# Patient Record
Sex: FEMALE | Race: WHITE | Employment: FULL TIME
[De-identification: names, ages, dates, MRNs, and addresses within clinical notes are randomized per-mention and may not be internally consistent; named-entity substitution may affect disease eponyms.]

---

## 2017-01-03 ENCOUNTER — OFFICE VISIT (OUTPATIENT)
Dept: GASTROENTEROLOGY | Facility: CLINIC | Age: 67
End: 2017-01-03

## 2017-01-03 VITALS
WEIGHT: 144.8 LBS | SYSTOLIC BLOOD PRESSURE: 128 MMHG | OXYGEN SATURATION: 97 % | BODY MASS INDEX: 25.66 KG/M2 | HEART RATE: 64 BPM | RESPIRATION RATE: 14 BRPM | TEMPERATURE: 97.3 F | HEIGHT: 63 IN | DIASTOLIC BLOOD PRESSURE: 66 MMHG

## 2017-01-03 DIAGNOSIS — K59.00 CONSTIPATION, UNSPECIFIED CONSTIPATION TYPE: ICD-10-CM

## 2017-01-03 DIAGNOSIS — K21.9 GASTROESOPHAGEAL REFLUX DISEASE WITHOUT ESOPHAGITIS: ICD-10-CM

## 2017-01-03 DIAGNOSIS — K58.9 IRRITABLE BOWEL SYNDROME WITHOUT DIARRHEA: Primary | ICD-10-CM

## 2017-01-03 PROCEDURE — 99214 OFFICE O/P EST MOD 30 MIN: CPT | Performed by: INTERNAL MEDICINE

## 2017-01-03 RX ORDER — MELOXICAM 15 MG/1
TABLET ORAL
COMMUNITY
Start: 2016-12-05 | End: 2017-01-03

## 2017-01-03 RX ORDER — AMOXICILLIN 500 MG/1
CAPSULE ORAL
COMMUNITY
Start: 2016-12-29 | End: 2017-02-01 | Stop reason: ALTCHOICE

## 2017-01-03 RX ORDER — MELOXICAM 7.5 MG/1
TABLET ORAL
COMMUNITY
Start: 2016-11-29 | End: 2017-02-08

## 2017-01-03 ASSESSMENT — ENCOUNTER SYMPTOMS
BLOOD IN STOOL: 0
DIARRHEA: 1
CONSTIPATION: 1
ABDOMINAL PAIN: 1
SINUS PRESSURE: 1
ABDOMINAL DISTENTION: 1
COUGH: 1
EYES NEGATIVE: 1
BACK PAIN: 1
RECTAL PAIN: 0
ANAL BLEEDING: 0
VOMITING: 0
NAUSEA: 1

## 2017-01-04 DIAGNOSIS — K21.9 GASTROESOPHAGEAL REFLUX DISEASE WITHOUT ESOPHAGITIS: ICD-10-CM

## 2017-02-01 ENCOUNTER — OFFICE VISIT (OUTPATIENT)
Dept: OBGYN | Facility: CLINIC | Age: 67
End: 2017-02-01

## 2017-02-01 VITALS
BODY MASS INDEX: 23.9 KG/M2 | SYSTOLIC BLOOD PRESSURE: 118 MMHG | WEIGHT: 140 LBS | DIASTOLIC BLOOD PRESSURE: 74 MMHG | HEIGHT: 64 IN | HEART RATE: 78 BPM

## 2017-02-01 DIAGNOSIS — N32.89 BLADDER SPASMS: Primary | ICD-10-CM

## 2017-02-01 PROCEDURE — 1036F TOBACCO NON-USER: CPT | Performed by: OBSTETRICS & GYNECOLOGY

## 2017-02-01 PROCEDURE — 3014F SCREEN MAMMO DOC REV: CPT | Performed by: OBSTETRICS & GYNECOLOGY

## 2017-02-01 PROCEDURE — 3017F COLORECTAL CA SCREEN DOC REV: CPT | Performed by: OBSTETRICS & GYNECOLOGY

## 2017-02-01 PROCEDURE — 1123F ACP DISCUSS/DSCN MKR DOCD: CPT | Performed by: OBSTETRICS & GYNECOLOGY

## 2017-02-01 PROCEDURE — G8420 CALC BMI NORM PARAMETERS: HCPCS | Performed by: OBSTETRICS & GYNECOLOGY

## 2017-02-01 PROCEDURE — G8484 FLU IMMUNIZE NO ADMIN: HCPCS | Performed by: OBSTETRICS & GYNECOLOGY

## 2017-02-01 PROCEDURE — 4040F PNEUMOC VAC/ADMIN/RCVD: CPT | Performed by: OBSTETRICS & GYNECOLOGY

## 2017-02-01 PROCEDURE — 1090F PRES/ABSN URINE INCON ASSESS: CPT | Performed by: OBSTETRICS & GYNECOLOGY

## 2017-02-01 PROCEDURE — G8400 PT W/DXA NO RESULTS DOC: HCPCS | Performed by: OBSTETRICS & GYNECOLOGY

## 2017-02-01 PROCEDURE — G8427 DOCREV CUR MEDS BY ELIG CLIN: HCPCS | Performed by: OBSTETRICS & GYNECOLOGY

## 2017-02-01 PROCEDURE — 99213 OFFICE O/P EST LOW 20 MIN: CPT | Performed by: OBSTETRICS & GYNECOLOGY

## 2017-02-01 RX ORDER — SULFAMETHOXAZOLE AND TRIMETHOPRIM 800; 160 MG/1; MG/1
1 TABLET ORAL 2 TIMES DAILY
Qty: 6 TABLET | Refills: 0 | Status: SHIPPED | OUTPATIENT
Start: 2017-02-01 | End: 2017-02-04

## 2017-02-01 RX ORDER — PHENAZOPYRIDINE HYDROCHLORIDE 200 MG/1
200 TABLET, FILM COATED ORAL 3 TIMES DAILY PRN
Qty: 9 TABLET | Refills: 0 | Status: SHIPPED | OUTPATIENT
Start: 2017-02-01 | End: 2017-02-04

## 2017-02-01 ASSESSMENT — ENCOUNTER SYMPTOMS
ABDOMINAL PAIN: 1
NAUSEA: 0
EYE DISCHARGE: 0
SHORTNESS OF BREATH: 0
COUGH: 0
SINUS PRESSURE: 0
EYE PAIN: 0

## 2017-02-08 ENCOUNTER — OFFICE VISIT (OUTPATIENT)
Dept: GASTROENTEROLOGY | Facility: CLINIC | Age: 67
End: 2017-02-08

## 2017-02-08 VITALS
TEMPERATURE: 97.3 F | OXYGEN SATURATION: 97 % | RESPIRATION RATE: 14 BRPM | BODY MASS INDEX: 25.87 KG/M2 | HEIGHT: 63 IN | WEIGHT: 146 LBS | SYSTOLIC BLOOD PRESSURE: 123 MMHG | DIASTOLIC BLOOD PRESSURE: 69 MMHG | HEART RATE: 69 BPM

## 2017-02-08 DIAGNOSIS — K21.9 GASTROESOPHAGEAL REFLUX DISEASE WITHOUT ESOPHAGITIS: ICD-10-CM

## 2017-02-08 DIAGNOSIS — K58.9 IRRITABLE BOWEL SYNDROME WITHOUT DIARRHEA: Primary | ICD-10-CM

## 2017-02-08 DIAGNOSIS — R10.9 ABDOMINAL PAIN, OTHER SPECIFIED SITE: ICD-10-CM

## 2017-02-08 DIAGNOSIS — K59.00 CONSTIPATION, UNSPECIFIED CONSTIPATION TYPE: ICD-10-CM

## 2017-02-08 PROCEDURE — 3017F COLORECTAL CA SCREEN DOC REV: CPT | Performed by: INTERNAL MEDICINE

## 2017-02-08 PROCEDURE — 99214 OFFICE O/P EST MOD 30 MIN: CPT | Performed by: INTERNAL MEDICINE

## 2017-02-08 PROCEDURE — 1090F PRES/ABSN URINE INCON ASSESS: CPT | Performed by: INTERNAL MEDICINE

## 2017-02-08 PROCEDURE — 4040F PNEUMOC VAC/ADMIN/RCVD: CPT | Performed by: INTERNAL MEDICINE

## 2017-02-08 PROCEDURE — 1036F TOBACCO NON-USER: CPT | Performed by: INTERNAL MEDICINE

## 2017-02-08 PROCEDURE — G8484 FLU IMMUNIZE NO ADMIN: HCPCS | Performed by: INTERNAL MEDICINE

## 2017-02-08 PROCEDURE — 1123F ACP DISCUSS/DSCN MKR DOCD: CPT | Performed by: INTERNAL MEDICINE

## 2017-02-08 PROCEDURE — 3014F SCREEN MAMMO DOC REV: CPT | Performed by: INTERNAL MEDICINE

## 2017-02-08 PROCEDURE — G8427 DOCREV CUR MEDS BY ELIG CLIN: HCPCS | Performed by: INTERNAL MEDICINE

## 2017-02-08 PROCEDURE — G8420 CALC BMI NORM PARAMETERS: HCPCS | Performed by: INTERNAL MEDICINE

## 2017-02-08 PROCEDURE — G8400 PT W/DXA NO RESULTS DOC: HCPCS | Performed by: INTERNAL MEDICINE

## 2017-02-08 RX ORDER — OXYBUTYNIN CHLORIDE 5 MG/1
TABLET ORAL
COMMUNITY
Start: 2017-02-06 | End: 2017-03-09

## 2017-02-08 ASSESSMENT — ENCOUNTER SYMPTOMS
BLOOD IN STOOL: 0
RECTAL PAIN: 0
CONSTIPATION: 0
BACK PAIN: 1
RESPIRATORY NEGATIVE: 1
NAUSEA: 0
ABDOMINAL PAIN: 1
VOMITING: 0
ALLERGIC/IMMUNOLOGIC NEGATIVE: 1
ABDOMINAL DISTENTION: 1
EYES NEGATIVE: 1
ANAL BLEEDING: 0
DIARRHEA: 0

## 2017-02-13 ENCOUNTER — TELEPHONE (OUTPATIENT)
Dept: GASTROENTEROLOGY | Facility: CLINIC | Age: 67
End: 2017-02-13

## 2017-02-13 RX ORDER — DICYCLOMINE HCL 20 MG
20 TABLET ORAL 3 TIMES DAILY PRN
Qty: 90 TABLET | Refills: 1 | OUTPATIENT
Start: 2017-02-13

## 2017-03-01 ENCOUNTER — HOSPITAL ENCOUNTER (OUTPATIENT)
Age: 67
Setting detail: SPECIMEN
Discharge: HOME | End: 2017-03-01
Payer: MEDICARE

## 2017-03-01 ENCOUNTER — OFFICE VISIT (OUTPATIENT)
Dept: OBGYN | Facility: CLINIC | Age: 67
End: 2017-03-01

## 2017-03-01 VITALS
HEIGHT: 64 IN | SYSTOLIC BLOOD PRESSURE: 96 MMHG | WEIGHT: 132.5 LBS | HEART RATE: 58 BPM | BODY MASS INDEX: 22.62 KG/M2 | DIASTOLIC BLOOD PRESSURE: 59 MMHG

## 2017-03-01 DIAGNOSIS — Z13.820 SCREENING FOR OSTEOPOROSIS: ICD-10-CM

## 2017-03-01 DIAGNOSIS — Z01.419 WOMEN'S ANNUAL ROUTINE GYNECOLOGICAL EXAMINATION: Primary | ICD-10-CM

## 2017-03-01 DIAGNOSIS — Z12.31 SCREENING MAMMOGRAM, ENCOUNTER FOR: ICD-10-CM

## 2017-03-01 PROCEDURE — G0101 CA SCREEN;PELVIC/BREAST EXAM: HCPCS | Performed by: OBSTETRICS & GYNECOLOGY

## 2017-03-07 LAB — CYTOLOGY REPORT: NORMAL

## 2017-03-09 ENCOUNTER — OFFICE VISIT (OUTPATIENT)
Dept: GASTROENTEROLOGY | Facility: CLINIC | Age: 67
End: 2017-03-09

## 2017-03-09 VITALS
HEART RATE: 69 BPM | HEIGHT: 63 IN | BODY MASS INDEX: 23.39 KG/M2 | SYSTOLIC BLOOD PRESSURE: 117 MMHG | WEIGHT: 132 LBS | RESPIRATION RATE: 14 BRPM | DIASTOLIC BLOOD PRESSURE: 74 MMHG | OXYGEN SATURATION: 98 % | TEMPERATURE: 97.1 F

## 2017-03-09 DIAGNOSIS — K21.9 GASTROESOPHAGEAL REFLUX DISEASE, ESOPHAGITIS PRESENCE NOT SPECIFIED: ICD-10-CM

## 2017-03-09 DIAGNOSIS — K21.9 GASTROESOPHAGEAL REFLUX DISEASE WITHOUT ESOPHAGITIS: ICD-10-CM

## 2017-03-09 DIAGNOSIS — K58.9 IRRITABLE BOWEL SYNDROME WITHOUT DIARRHEA: Primary | ICD-10-CM

## 2017-03-09 PROCEDURE — 3014F SCREEN MAMMO DOC REV: CPT | Performed by: INTERNAL MEDICINE

## 2017-03-09 PROCEDURE — 4040F PNEUMOC VAC/ADMIN/RCVD: CPT | Performed by: INTERNAL MEDICINE

## 2017-03-09 PROCEDURE — G8420 CALC BMI NORM PARAMETERS: HCPCS | Performed by: INTERNAL MEDICINE

## 2017-03-09 PROCEDURE — 1036F TOBACCO NON-USER: CPT | Performed by: INTERNAL MEDICINE

## 2017-03-09 PROCEDURE — 99214 OFFICE O/P EST MOD 30 MIN: CPT | Performed by: INTERNAL MEDICINE

## 2017-03-09 PROCEDURE — 1090F PRES/ABSN URINE INCON ASSESS: CPT | Performed by: INTERNAL MEDICINE

## 2017-03-09 PROCEDURE — 3017F COLORECTAL CA SCREEN DOC REV: CPT | Performed by: INTERNAL MEDICINE

## 2017-03-09 PROCEDURE — G8400 PT W/DXA NO RESULTS DOC: HCPCS | Performed by: INTERNAL MEDICINE

## 2017-03-09 PROCEDURE — 1123F ACP DISCUSS/DSCN MKR DOCD: CPT | Performed by: INTERNAL MEDICINE

## 2017-03-09 PROCEDURE — G8427 DOCREV CUR MEDS BY ELIG CLIN: HCPCS | Performed by: INTERNAL MEDICINE

## 2017-03-09 PROCEDURE — G8484 FLU IMMUNIZE NO ADMIN: HCPCS | Performed by: INTERNAL MEDICINE

## 2017-03-09 RX ORDER — FLUCONAZOLE 100 MG/1
100 TABLET ORAL DAILY
Qty: 5 TABLET | Refills: 0 | Status: SHIPPED | OUTPATIENT
Start: 2017-03-09 | End: 2017-03-14

## 2017-03-09 RX ORDER — METRONIDAZOLE 500 MG/1
500 TABLET ORAL 3 TIMES DAILY
Qty: 21 TABLET | Refills: 0 | Status: SHIPPED | OUTPATIENT
Start: 2017-03-09 | End: 2017-03-16

## 2017-03-09 RX ORDER — POLYETHYLENE GLYCOL 3350 17 G/17G
17 POWDER, FOR SOLUTION ORAL DAILY
COMMUNITY

## 2017-03-09 RX ORDER — CIPROFLOXACIN 500 MG/1
500 TABLET, FILM COATED ORAL 2 TIMES DAILY
Qty: 12 TABLET | Refills: 0 | Status: SHIPPED | OUTPATIENT
Start: 2017-03-09 | End: 2017-03-16

## 2017-03-09 ASSESSMENT — ENCOUNTER SYMPTOMS
DIARRHEA: 0
BACK PAIN: 1
RESPIRATORY NEGATIVE: 1
CONSTIPATION: 0
NAUSEA: 0
EYES NEGATIVE: 1
ABDOMINAL PAIN: 1
RECTAL PAIN: 0
VOMITING: 0
ABDOMINAL DISTENTION: 1
ANAL BLEEDING: 0
ALLERGIC/IMMUNOLOGIC NEGATIVE: 1
BLOOD IN STOOL: 0

## 2017-03-22 ENCOUNTER — HOSPITAL ENCOUNTER (OUTPATIENT)
Age: 67
Discharge: HOME | End: 2017-03-22
Payer: MEDICARE

## 2017-04-24 DIAGNOSIS — Z12.31 SCREENING MAMMOGRAM, ENCOUNTER FOR: ICD-10-CM

## 2017-08-30 RX ORDER — LINACLOTIDE 145 UG/1
CAPSULE, GELATIN COATED ORAL
Qty: 30 CAPSULE | Refills: 3 | Status: SHIPPED | OUTPATIENT
Start: 2017-08-30

## 2020-03-10 LAB
ALT SERPL-CCNC: 29 U/L (ref 0–55)
AST SERPL-CCNC: 24 U/L (ref 5–34)
CHOLESTEROL/HDL RELATIVE RISK: 3.36
CHOLESTEROL: 178 MG/DL
HDLC SERPL-MCNC: 53 MG/DL (ref 45–60)
LDL CHOLESTEROL: 111 MG/DL
TRIGL SERPL-MCNC: 71 MG/DL
VLDLC SERPL CALC-MCNC: 14 MG/DL (ref 5–35)

## 2020-11-03 PROBLEM — K21.9 GERD (GASTROESOPHAGEAL REFLUX DISEASE): Status: RESOLVED | Noted: 2020-11-03 | Resolved: 2020-11-03

## 2023-06-20 ENCOUNTER — TELEPHONE (OUTPATIENT)
Dept: SURGERY | Age: 73
End: 2023-06-20

## 2023-06-20 NOTE — TELEPHONE ENCOUNTER
Angelika phoned stating that she needs an urgent bowel surgery  as her colon is not functioning normally. Dr. Magaly Corral was recommended by Luis F De Oliveira a former patient. I did explain that Dr. Ani Castle schedule is very full and his surgery schedule is also booked ahead. I let her know that since she lives in [place] which is quite a drive to [facility] if she were to experience any postoperative  complication. I told her would have to take a note so Dr. Maddie Garcia could talk with him regarding her requests.   The name of her present doc  is "[employer] [facility]

## 2023-06-20 NOTE — TELEPHONE ENCOUNTER
Contacted patient to get more information regarding the need for her colectomy due to bowel not working normally. Patient reports she has had at least 25 yrs hx of IBS with constipation. She is currently on the highest regimen (Linzess 290 mcg in AM, 2 cap of miralax at bedtime and senna at bedtime). She reports she does not take fiber as in the past she was told not to but cannot remember why she was told not to take it. She states she has been on this heavy regimen for about 1.5 yrs. Patient has seen a GI provider at [facility] who recommended surgery but at the time was not ready for it and was started on this regimen. She recently saw Dr. Nithin Borjas surgeon on 6/5/2023 who told patient her colon doesn't work properly and believes patient most likely has colonic inertia which is greatly affecting her life due to the inability to eat and have regular bowel movements. She recommended patient to undergo a total abdominal colectomy with ileorectal anastomosis and wanted patient to see Dr. Mango Quispe. She currently can only eat a few crackers, 1 potato and water throughout the day. When the surgery was recommended they mentioned she was to have a sitz marker done to check motility, Dr. Mango Quispe said he requires it to do her surgery.  told patient she would do her surgery without the sitz marker but only does the surgery open. Patient is not wanting the sitz marker because you have to be off your medications and she does not think she could go that long without her medications to keep her from being severely constipated. She also would like the surgery to be done robotically/laparoscopic and not open that is why she doesn't want to have it performed by Dr. Amaris Laboy . Dr. Paula Victor gave patient surgeons names and Dr. Giuliana Galarza was one of the surgeons recommended. She has been doing colon preps to help with her constipation.  She states she has had no blockages but every time she has an x-ray it states she is full

## 2023-07-10 ENCOUNTER — INITIAL CONSULT (OUTPATIENT)
Dept: SURGERY | Age: 73
End: 2023-07-10
Payer: MEDICARE

## 2023-07-10 VITALS
RESPIRATION RATE: 16 BRPM | HEART RATE: 78 BPM | BODY MASS INDEX: 21.69 KG/M2 | WEIGHT: 122.4 LBS | TEMPERATURE: 98 F | SYSTOLIC BLOOD PRESSURE: 124 MMHG | HEIGHT: 63 IN | DIASTOLIC BLOOD PRESSURE: 60 MMHG

## 2023-07-10 DIAGNOSIS — K59.9 COLONIC INERTIA: ICD-10-CM

## 2023-07-10 DIAGNOSIS — K59.04 CHRONIC IDIOPATHIC CONSTIPATION: ICD-10-CM

## 2023-07-10 DIAGNOSIS — Z01.818 PRE-OP TESTING: Primary | ICD-10-CM

## 2023-07-10 PROCEDURE — 1090F PRES/ABSN URINE INCON ASSESS: CPT | Performed by: SURGERY

## 2023-07-10 PROCEDURE — 3017F COLORECTAL CA SCREEN DOC REV: CPT | Performed by: SURGERY

## 2023-07-10 PROCEDURE — G8427 DOCREV CUR MEDS BY ELIG CLIN: HCPCS | Performed by: SURGERY

## 2023-07-10 PROCEDURE — 99214 OFFICE O/P EST MOD 30 MIN: CPT | Performed by: SURGERY

## 2023-07-10 PROCEDURE — G8400 PT W/DXA NO RESULTS DOC: HCPCS | Performed by: SURGERY

## 2023-07-10 PROCEDURE — G8420 CALC BMI NORM PARAMETERS: HCPCS | Performed by: SURGERY

## 2023-07-10 PROCEDURE — 99215 OFFICE O/P EST HI 40 MIN: CPT

## 2023-07-10 PROCEDURE — 1123F ACP DISCUSS/DSCN MKR DOCD: CPT | Performed by: SURGERY

## 2023-07-10 PROCEDURE — 1036F TOBACCO NON-USER: CPT | Performed by: SURGERY

## 2023-07-10 RX ORDER — POLYETHYLENE GLYCOL 3350, SODIUM SULFATE ANHYDROUS, SODIUM BICARBONATE, SODIUM CHLORIDE, POTASSIUM CHLORIDE 236; 22.74; 6.74; 5.86; 2.97 G/4L; G/4L; G/4L; G/4L; G/4L
POWDER, FOR SOLUTION ORAL
COMMUNITY

## 2023-07-10 RX ORDER — LANOLIN ALCOHOL/MO/W.PET/CERES
CREAM (GRAM) TOPICAL
COMMUNITY
End: 2023-07-10

## 2023-07-10 RX ORDER — METHYLPREDNISOLONE 4 MG/1
TABLET ORAL
COMMUNITY
Start: 2023-06-06 | End: 2023-07-10

## 2023-07-10 RX ORDER — SODIUM PHOSPHATE,MONO-DIBASIC 19G-7G/118
ENEMA (ML) RECTAL
COMMUNITY
Start: 2021-03-02

## 2023-07-10 RX ORDER — LACTULOSE 10 G/15ML
SOLUTION ORAL
COMMUNITY
End: 2023-07-10

## 2023-07-10 RX ORDER — DICLOFENAC SODIUM 75 MG/1
75 TABLET, DELAYED RELEASE ORAL 2 TIMES DAILY WITH MEALS
COMMUNITY
Start: 2021-04-05 | End: 2023-07-10

## 2023-07-10 RX ORDER — ALBUTEROL SULFATE 90 UG/1
AEROSOL, METERED RESPIRATORY (INHALATION)
COMMUNITY

## 2023-07-10 RX ORDER — PSEUDOEPHEDRINE HCL 30 MG
100 TABLET ORAL 2 TIMES DAILY
COMMUNITY

## 2023-07-10 RX ORDER — SENNA PLUS 8.6 MG/1
TABLET ORAL
COMMUNITY
Start: 2021-01-26

## 2023-07-10 RX ORDER — ERYTHROMYCIN 500 MG/1
TABLET, COATED ORAL
Qty: 6 TABLET | Refills: 0 | Status: SHIPPED | OUTPATIENT
Start: 2023-07-10

## 2023-07-10 RX ORDER — BISACODYL 10 MG
SUPPOSITORY, RECTAL RECTAL
COMMUNITY
End: 2023-07-10

## 2023-07-10 RX ORDER — PRASTERONE (DHEA) 25 MG
1 CAPSULE ORAL DAILY
COMMUNITY
End: 2023-07-10

## 2023-07-10 RX ORDER — ESTRADIOL 0.1 MG/G
CREAM VAGINAL
COMMUNITY

## 2023-07-10 RX ORDER — UREA 10 %
1 LOTION (ML) TOPICAL DAILY
COMMUNITY

## 2023-07-10 RX ORDER — TAMSULOSIN HYDROCHLORIDE 0.4 MG/1
0.4 CAPSULE ORAL DAILY
COMMUNITY
End: 2023-07-10

## 2023-07-10 RX ORDER — NEOMYCIN SULFATE 500 MG/1
TABLET ORAL
Qty: 6 TABLET | Refills: 0 | Status: SHIPPED | OUTPATIENT
Start: 2023-07-10

## 2023-07-11 ENCOUNTER — TELEPHONE (OUTPATIENT)
Dept: SURGERY | Age: 73
End: 2023-07-11

## 2023-07-19 ENCOUNTER — OFFICE VISIT (OUTPATIENT)
Dept: NUTRITION | Age: 73
End: 2023-07-19

## 2023-07-19 DIAGNOSIS — K90.9 INTESTINAL MALABSORPTION, UNSPECIFIED TYPE: Primary | ICD-10-CM

## 2023-07-19 NOTE — PROGRESS NOTES
Dietary Assessment Note    Labs reviewed: labs are reviewed, up to date and normal, no lab studies available for review at time of visit    Protein intake: <60 grams/day     Fluid intake: <48 oz/day    Multivitamin/mineral intake: Not taking any    Calcium intake: Other:     Nutrition Assessment: Patient was seen for concerns of IBS. Patient stated early in the conversation that she has several allergies to dairy, cheese, and she thinks she is allergic to gluten but wants to ask her PCP about a possible celiac test. She had some papers related to a low FODMAP diet which she has started to try in the last week with some results. Patient was given several resources on the FODMAP diet including an IBS website dedicated to this type of diet. Also printed her information from the academy of [employer]. Several recipe ideas were also shared with the patient such as quinoa crust pizza and other recipes that follow a low FODMAP diet. She is excited she is starting to see results so far and really wants to be strict with her diet to see if she can do this instead of the intervention of surgery depending on the results of her strict diet. Encouraged patient to make informed decisions and that it is always her choice. Encouraged use of a vegan supplement that provides 20 grams of protein and is made from pumpkin/pea proteins which also follow her low FODMAP diet. This patient is motivated to help fix her GI issues and would like to follow up in one months time to tell me about her progress. Ht 63 inches   #  BMI 21.7   [facility] [id number] kcals per day using 25-30 g/kg of actual body weight, protein needs is 66-73 grams per day using 1.2-1.3 grams of actual body weigth, fluid needs is 1ml/kcal     eats a balanced diet    Food Intolerances/issues: none      Client Concerns: unsure of surgery, does not want side effects of not having a colon. Goals:  Follow strict Low FODMAP diet to see if GI symptoms

## 2023-07-25 ENCOUNTER — TELEPHONE (OUTPATIENT)
Dept: SURGERY | Age: 73
End: 2023-07-25

## 2023-07-25 NOTE — TELEPHONE ENCOUNTER
Patient called stating she wanted to cancel her upcoming surgery, scheduled for 9/18/2023. Stated she decided she is not comfortable having it done at this time. Stated she has other testing she needed to cancel concerning the surgery as well. Can contact patient with any questions at # [phone].

## 2023-07-25 NOTE — TELEPHONE ENCOUNTER
Patient mentioned at office visit, she will be getting her preop testing done closer to home and will have them fax the results once she completes the orders.
Patient's surgery has been canceled as patient does not want to proceed at this time. Notified surgery center.
04/08/2015 04:18 AM    APTT 22.8 04/08/2015 04:18 AM     HCG (If Applicable) No results found for: PREGTESTUR, PREGSERUM, HCG, HCGQUANT   ABGs No results found for: PHART, PO2ART, BTT3ZEN, TFB0PMT, BEART, F5OAWHIK   Type & Screen (If Applicable)  No results found for: Fiona Labrador    Additional ordered pre-operative testing:  [x]CBC    []ABG      [] BMP   []URINALYSIS   [x]CMP    []HCG   []COAGS PT/INR  []T&C  []LFTs   [x]TYPE AND SCREEN    [x] EKG  [x] Chest X-Ray  [] Other Radiology    [x] Sent to Hospitalist   [x] Sent to Anesthesia for your review:       [] Additional Orders: None     Comments:None   Requests: None    Signed: Christine Marshall RN 7/11/2023 9:45 AM

## 2023-09-13 ENCOUNTER — OFFICE VISIT (OUTPATIENT)
Dept: SURGERY | Age: 73
End: 2023-09-13
Payer: MEDICARE

## 2023-09-13 VITALS
HEIGHT: 63 IN | SYSTOLIC BLOOD PRESSURE: 132 MMHG | BODY MASS INDEX: 21.62 KG/M2 | HEART RATE: 68 BPM | RESPIRATION RATE: 16 BRPM | TEMPERATURE: 97.6 F | DIASTOLIC BLOOD PRESSURE: 72 MMHG | WEIGHT: 122 LBS

## 2023-09-13 DIAGNOSIS — K59.9 COLONIC INERTIA: ICD-10-CM

## 2023-09-13 DIAGNOSIS — K59.04 CHRONIC IDIOPATHIC CONSTIPATION: Primary | ICD-10-CM

## 2023-09-13 PROCEDURE — 3017F COLORECTAL CA SCREEN DOC REV: CPT | Performed by: SURGERY

## 2023-09-13 PROCEDURE — G8420 CALC BMI NORM PARAMETERS: HCPCS | Performed by: SURGERY

## 2023-09-13 PROCEDURE — 1090F PRES/ABSN URINE INCON ASSESS: CPT | Performed by: SURGERY

## 2023-09-13 PROCEDURE — 99212 OFFICE O/P EST SF 10 MIN: CPT | Performed by: SURGERY

## 2023-09-13 PROCEDURE — 1123F ACP DISCUSS/DSCN MKR DOCD: CPT | Performed by: SURGERY

## 2023-09-13 PROCEDURE — G8400 PT W/DXA NO RESULTS DOC: HCPCS | Performed by: SURGERY

## 2023-09-13 PROCEDURE — 1036F TOBACCO NON-USER: CPT | Performed by: SURGERY

## 2023-09-13 PROCEDURE — G8427 DOCREV CUR MEDS BY ELIG CLIN: HCPCS | Performed by: SURGERY

## 2023-09-13 NOTE — PATIENT INSTRUCTIONS
Continue Diet and medication regimen for constipation. Stay on both Bowel control program wait lists at [facility] and [facility]      We will look into getting you set up with Nutritionist- possibly [facility] as that is closer to home for you.       Call our office with any questions or concerns

## 2023-09-13 NOTE — PROGRESS NOTES
Patient here to discuss alternatives to surgery for digestive issues. Last OV 7/10 for chronic constipation. Our office set her up for a Lap Rahat Assisted Total Colectomy for 9/18. Patient cancelled due to not feeling comfortable about having surgery. Her Urologist and GP doctors had told her she was not a good candidate for surgery due to history of abdominal adhesions. 7/19 Patient saw Andrea Travis. She states she had been following the FODMAP diet, no wheat, no diary and having some success. Nutritionist recommended continuing to follow that diet along with adding a vegan supplement that would provide her with 20 grams of protein. 8/16 Patient follows up with her PCP, Zachariah Avalos regarding IBS, constipation. Reports doing well with FODMAP diet but knows eventually she will have another IBS flare up and wants to prevent it. Patient has had a long history of chronic constipation (for 30 years). She has been seen by the [facility] GI with her last visit in 2018. She was treated with Linzess 290 mcg every morning senna at bedtime stool softener at bedtime 2 capfuls of MiraLAX at bedtime. A regimen that she is still taking. Has seen both Dr. Keny Polanco and Sherry Iglesias in past as well.        Last Colonoscopy 3/14/23 - Dr. Walker Kidney @ [facility] - Diverticulosis, no specimens collected       Patient states she is currently on wait lists at --  [place] for [address] [facility] [employer] [facility] (Phone#: [phone])  [facility] (Phone#: [phone])

## 2023-09-13 NOTE — PROGRESS NOTES
Pt is here to discuss her IBS-C and atonic colon issues. She has had extensive work up by colorectal and [facility]. She was recommended to have subtotal colectoomy  for atonic colon from colorectal in [place]. She was going [place] have [place] [facility] [place] done robotically. But then she came to us and we were able to do it robotically and we had her set up. But we also, sent to nutritionistl who worked with her and set her up on the "[employer]. She is doing really well . No symptoms last one month. She wants to go to GI/holosystic GI to look at multiple diff tx options. She is scheduled in [place] and in [facility]. She prefers to go to [facility] clinic. She can not get in fot 6-8 months. She wants to know if we will follow her GI issues until she can get in. She is doing good now so will not change anything. But I will be glad to treat her GI issues until she gets to her apt. Then we can follow PRN until  she wants us to see her again.

## 2023-09-15 ENCOUNTER — TELEPHONE (OUTPATIENT)
Dept: SURGERY | Age: 73
End: 2023-09-15

## 2023-09-15 DIAGNOSIS — K59.04 CHRONIC IDIOPATHIC CONSTIPATION: Primary | ICD-10-CM

## 2023-09-15 DIAGNOSIS — K59.9 COLONIC INERTIA: ICD-10-CM

## 2023-09-15 NOTE — TELEPHONE ENCOUNTER
Patient requesting nutritionist closer to home [facility]). Spoke with patient access line through [place]. 's, [address] [facility] and they were able to see our office's referral and will call patient to schedule an appt. LVM letting patient know.

## 2023-09-21 ENCOUNTER — TELEPHONE (OUTPATIENT)
Dept: SURGERY | Age: 73
End: 2023-09-21

## 2023-09-21 NOTE — TELEPHONE ENCOUNTER
Patient called the office she has some concerns with her Referral to the Nutritionist. Please advise and give her a call

## 2023-09-22 NOTE — TELEPHONE ENCOUNTER
Spoke with patient 9/21 regarding concerns. 9/22 Spoke with [facility], [facility] and [facility] Access line. They will reach out to patient this am to offer a nutritionist appt at [facility] or [facility]. Chris Spoke with patient and she received a vm from the access line this am and will call them back at her convenience to schedule.

## 2023-11-06 ENCOUNTER — TELEPHONE (OUTPATIENT)
Dept: SURGERY | Age: 73
End: 2023-11-06

## 2023-11-06 NOTE — TELEPHONE ENCOUNTER
Patient called requesting to speak with [facility]" with Dr Chidi Najera. She states she has received Rx assistance for Linzess. She wants to know if Dr Chidi Najera will write a Rx for next year for this medication. She states the Rx does not go into the pharmacy, but to the  \"\" so she wants to speak to nurse about this.   Call [phone]

## 2023-11-06 NOTE — TELEPHONE ENCOUNTER
Called patient. She reports beginning of the year she needs a new script for her Linzess and she gets it through a prescription assistance program due to the cost of the medication. She was told a new script application will need to be filled out by her GI provider to be approved for the medication the beginning of the year, she is wanting to know if Dr. Thea Bence would be willing to fill out the medication application for her to continue the medication since he is her GI provider. Verified fax number with patient for her to fax the application to our office. Per LOV on 9/13/2023 at [facility] with Dr. Thea Bence Dr. Thea Bence mentions in his note: \"[facility] to look at multiple diff tx options. She is scheduled in [place] and in [facility] clinic. She prefers to go to [facility] She can not get in fot 6-8 months. She wants to know if we will follow her GI issues until she can get in. She is doing good now so will not change anything. But I will be glad to treat her GI issues until she gets to her apt. Then we can follow PRN until  she wants us to see her again. \"    She reports she is not scheduled to see a GI provider in the future and is not planning on seeing a GI provider and she did see a GI provider at [address] [place] [address] years ago, which she was not a fan of. She states that during the office visit she mentioned there was discussion of doing a motility test but was not able to get in for awhile at a GI office and it was done by a specialist so she was going to see the specialist but then continue to follow up with Dr. Thea Bence for her GI issues since he knows her history. Before patient sends application are you okay with filling out her application to continue her Linzess and continue treating her GI issues.

## 2023-11-07 NOTE — TELEPHONE ENCOUNTER
Left message on patient's voicemail stating Dr. Arun Varma is okay with completing the application. Fax number given. Phone number given for any questions.

## 2024-01-29 ENCOUNTER — TELEPHONE (OUTPATIENT)
Dept: SURGERY | Age: 74
End: 2024-01-29

## 2024-01-29 NOTE — TELEPHONE ENCOUNTER
Patient had called and states that Patient Assistance program will not fill her medication til have Dr Mcclure's [place] licensure number. Called [phone] and spoke with Marilyn.  Verified  Dr Mcclure's number. Patient then was issues the medication and will receive it in 7-10 days. Made patient aware of this and verbalized understanding.

## 2024-02-26 ENCOUNTER — TELEPHONE (OUTPATIENT)
Dept: SURGERY | Age: 74
End: 2024-02-26

## 2024-02-26 NOTE — TELEPHONE ENCOUNTER
Patient called requesting an appointment with Dr Mcclure for reflux.  She states she does see him for IBS.  Patient does not want to wait until May for the next opening and [place] office is too far away from where she lives.  Call her back at [phone]

## 2024-02-26 NOTE — TELEPHONE ENCOUNTER
Please advise if you are able to see patient sooner than first available which is in May? Patient does not want to go to [facility].

## 2024-02-27 NOTE — TELEPHONE ENCOUNTER
Contacted patient and offered 3/7 at 8:30 AM- patient states unable to make that time as she lives far away. I explained Dr. Mcclure does not have any openings at this time but I can send a message to him to see if there is another date he is able to squeeze her in. She state she [place] [facility] [place] [facility] if she needs to. Scheduled appt on 3/20 at 1 PM at [facility]. [place] put [place].

## 2024-05-20 ENCOUNTER — INITIAL CONSULT (OUTPATIENT)
Dept: SURGERY | Age: 74
End: 2024-05-20
Payer: MEDICARE

## 2024-05-20 VITALS
DIASTOLIC BLOOD PRESSURE: 76 MMHG | SYSTOLIC BLOOD PRESSURE: 128 MMHG | HEART RATE: 74 BPM | WEIGHT: 136 LBS | BODY MASS INDEX: 24.09 KG/M2

## 2024-05-20 DIAGNOSIS — K59.89 COLON ATONIC: Primary | ICD-10-CM

## 2024-05-20 PROCEDURE — 99214 OFFICE O/P EST MOD 30 MIN: CPT | Performed by: SURGERY

## 2024-05-20 PROCEDURE — G8400 PT W/DXA NO RESULTS DOC: HCPCS | Performed by: SURGERY

## 2024-05-20 PROCEDURE — 1036F TOBACCO NON-USER: CPT | Performed by: SURGERY

## 2024-05-20 PROCEDURE — 1123F ACP DISCUSS/DSCN MKR DOCD: CPT | Performed by: SURGERY

## 2024-05-20 PROCEDURE — G8420 CALC BMI NORM PARAMETERS: HCPCS | Performed by: SURGERY

## 2024-05-20 PROCEDURE — G8427 DOCREV CUR MEDS BY ELIG CLIN: HCPCS | Performed by: SURGERY

## 2024-05-20 PROCEDURE — 1090F PRES/ABSN URINE INCON ASSESS: CPT | Performed by: SURGERY

## 2024-05-20 NOTE — PROGRESS NOTES
H &P EGD  Patient:Angelika Marie                 :1950  (YES) patient has seen Dr. Mcclure prior to procedure  PCP: Dr. Mercedez Herman    CC:acid reflux        HPI:  Pt  is 75 yo F here for GERD.  Has nausea, dysphagia  Last EGD in 2022  No tx      EGD  Nausea: yes  Vomiting: no  Heartburn:no  Dysphagia:yes  Hematemesis:no  Epigastric pain:no  Anemia: no  Previous work up date:2022 EGD at [facility] showed gastritis  Current Treatment:none    Family History   Problem Relation Age of Onset    Cancer Mother         lung    Cancer Father         bone    Cancer Maternal Aunt         kidney    Cancer Maternal Grandfather         stomach     Social History     Socioeconomic History    Marital status:      Spouse name: Not on file    Number of children: Not on file    Years of education: Not on file    Highest education level: Not on file   Occupational History    Occupation:       Employer: [employer]   Tobacco Use    Smoking status: Former    Smokeless tobacco: Never   Substance and Sexual Activity    Alcohol use: Yes     Alcohol/week: 0.0 standard drinks of alcohol     Comment: rarely    Drug use: No    Sexual activity: Not Currently     Comment:    Other Topics Concern    Not on file   Social History Narrative    Not on file     Social Determinants of Health     Financial Resource Strain: Not on file   Food Insecurity: Not on file   Transportation Needs: Not on file   Physical Activity: Not on file   Stress: Not on file   Social Connections: Not on file   Intimate Partner Violence: Not on file   Housing Stability: Not on file     Past Surgical History:   Procedure Laterality Date    APPENDECTOMY  1968    BACK SURGERY      Dr. Link in 2015x 2; L4-S1 laminetomy in  and revision in 2015    CAPSULOTOMY Left 2022    CAPSULOTOMY Right 2022    CARPAL TUNNEL RELEASE Right     CATARACT EXTRACTION Bilateral     CHOLECYSTECTOMY          COLON

## 2024-06-03 ENCOUNTER — HOSPITAL ENCOUNTER (OUTPATIENT)
Dept: CT IMAGING | Age: 74
Discharge: HOME | End: 2024-06-05
Attending: SURGERY
Payer: MEDICARE

## 2024-06-03 DIAGNOSIS — K59.89 COLON ATONIC: ICD-10-CM

## 2024-06-03 PROCEDURE — 6360000004 HC RX CONTRAST MEDICATION: Performed by: SURGERY

## 2024-06-03 PROCEDURE — 2709999900 CT ABDOMEN PELVIS W IV CONTRAST

## 2024-06-03 PROCEDURE — 2500000003 HC RX 250 WO HCPCS: Performed by: SURGERY

## 2024-06-03 RX ADMIN — IOPAMIDOL 100 ML: 755 INJECTION, SOLUTION INTRAVENOUS at 13:43

## 2024-06-03 RX ADMIN — BARIUM SULFATE 450 ML: 20 SUSPENSION ORAL at 12:45

## 2024-06-04 ENCOUNTER — TELEPHONE (OUTPATIENT)
Dept: SURGERY | Age: 74
End: 2024-06-04

## 2024-06-04 NOTE — TELEPHONE ENCOUNTER
Patient called and states that after her CT scan with IV and PO contrast her Lt eye started to swell and then is swollen shut. It is also down the Lt side of her face and it feels warm.  Patient states that she is not having problems breathing.  Advise patient to take Benadryl. She states that she can not take it.  Told patient to go to the ER, urgent care, or see if she can see her PCP.  Patient states that she will try to go some where  and get check out. Placed the contrast dye on allergy list

## 2024-06-06 NOTE — TELEPHONE ENCOUNTER
Patient called into office this morning asking if there was anything else she could do for the rash she is getting from the dye she was given for her CT scan on Monday. She states it is now moving down her body. The rash is on her neck, chest,arms and hands. States she is breathing okay but uncomfortable. She did go to urgent care yesterday as our office had suggested. They recommended Benadryl. Patient states it isn't helping as much as she would like it to.     This RN spoke to Dr. Mcclure and he recommended patient go to ER to be evaluated as she may need more than Benadryl such as a steroid. Relayed recommendation to patient. She states she can not do steroids as they \"slow her down\". She did agree to going to the ER to be evaluated.     Patient will call our office with any other questions/concerns/updates.

## 2024-06-18 ENCOUNTER — TELEPHONE (OUTPATIENT)
Dept: SURGERY | Age: 74
End: 2024-06-18

## 2024-06-18 NOTE — TELEPHONE ENCOUNTER
Patient called asking if her surgery was still on due to the medication she was taking. Can call patient back at Ph# [phone].

## 2024-06-18 NOTE — TELEPHONE ENCOUNTER
Called patient back and answered all her questions.  Called over to Kimberly in the OR and explained that patient has been on steroids for poison ivy. OR states that it is okay to still have her procedure.

## 2024-07-02 ENCOUNTER — ANESTHESIA EVENT (OUTPATIENT)
Dept: OPERATING ROOM | Age: 74
End: 2024-07-02
Payer: MEDICARE

## 2024-07-02 ENCOUNTER — HOSPITAL ENCOUNTER (OUTPATIENT)
Age: 74
Setting detail: OUTPATIENT SURGERY
Discharge: HOME | End: 2024-07-02
Attending: SURGERY | Admitting: SURGERY
Payer: MEDICARE

## 2024-07-02 ENCOUNTER — ANESTHESIA (OUTPATIENT)
Dept: OPERATING ROOM | Age: 74
End: 2024-07-02
Payer: MEDICARE

## 2024-07-02 VITALS
OXYGEN SATURATION: 100 % | DIASTOLIC BLOOD PRESSURE: 75 MMHG | HEART RATE: 70 BPM | HEIGHT: 63 IN | SYSTOLIC BLOOD PRESSURE: 135 MMHG | RESPIRATION RATE: 20 BRPM | TEMPERATURE: 98.6 F | BODY MASS INDEX: 23.96 KG/M2 | WEIGHT: 135.2 LBS

## 2024-07-02 DIAGNOSIS — R13.19 ESOPHAGEAL DYSPHAGIA: ICD-10-CM

## 2024-07-02 DIAGNOSIS — R11.0 NAUSEA: ICD-10-CM

## 2024-07-02 PROCEDURE — 6360000002 HC RX W HCPCS: Performed by: NURSE ANESTHETIST, CERTIFIED REGISTERED

## 2024-07-02 PROCEDURE — 3700000000 HC ANESTHESIA ATTENDED CARE: Performed by: SURGERY

## 2024-07-02 PROCEDURE — 7100000010 HC PHASE II RECOVERY - FIRST 15 MIN: Performed by: SURGERY

## 2024-07-02 PROCEDURE — 88305 TISSUE EXAM BY PATHOLOGIST: CPT

## 2024-07-02 PROCEDURE — 7100000011 HC PHASE II RECOVERY - ADDTL 15 MIN: Performed by: SURGERY

## 2024-07-02 PROCEDURE — 3609012400 HC EGD TRANSORAL BIOPSY SINGLE/MULTIPLE: Performed by: SURGERY

## 2024-07-02 PROCEDURE — 2500000003 HC RX 250 WO HCPCS: Performed by: NURSE ANESTHETIST, CERTIFIED REGISTERED

## 2024-07-02 PROCEDURE — 2580000003 HC RX 258: Performed by: SURGERY

## 2024-07-02 PROCEDURE — 2709999900 HC NON-CHARGEABLE SUPPLY: Performed by: SURGERY

## 2024-07-02 RX ORDER — FAMOTIDINE 20 MG/1
20 TABLET, FILM COATED ORAL EVERY MORNING
Qty: 30 TABLET | Refills: 2 | Status: SHIPPED | OUTPATIENT
Start: 2024-07-02

## 2024-07-02 RX ORDER — LIDOCAINE HYDROCHLORIDE 10 MG/ML
INJECTION, SOLUTION EPIDURAL; INFILTRATION; INTRACAUDAL; PERINEURAL PRN
Status: DISCONTINUED | OUTPATIENT
Start: 2024-07-02 | End: 2024-07-02 | Stop reason: SDUPTHER

## 2024-07-02 RX ORDER — PROPOFOL 10 MG/ML
INJECTION, EMULSION INTRAVENOUS PRN
Status: DISCONTINUED | OUTPATIENT
Start: 2024-07-02 | End: 2024-07-02 | Stop reason: SDUPTHER

## 2024-07-02 RX ORDER — FAMOTIDINE 20 MG/1
20 TABLET, FILM COATED ORAL DAILY
COMMUNITY

## 2024-07-02 RX ORDER — SODIUM CHLORIDE, SODIUM LACTATE, POTASSIUM CHLORIDE, CALCIUM CHLORIDE 600; 310; 30; 20 MG/100ML; MG/100ML; MG/100ML; MG/100ML
INJECTION, SOLUTION INTRAVENOUS CONTINUOUS
Status: DISCONTINUED | OUTPATIENT
Start: 2024-07-02 | End: 2024-07-02 | Stop reason: HOSPADM

## 2024-07-02 RX ORDER — OMEPRAZOLE 20 MG/1
20 TABLET, DELAYED RELEASE ORAL EVERY MORNING
Qty: 30 TABLET | Refills: 2 | Status: SHIPPED | OUTPATIENT
Start: 2024-07-02

## 2024-07-02 RX ORDER — ONDANSETRON 2 MG/ML
INJECTION INTRAMUSCULAR; INTRAVENOUS PRN
Status: DISCONTINUED | OUTPATIENT
Start: 2024-07-02 | End: 2024-07-02 | Stop reason: SDUPTHER

## 2024-07-02 RX ADMIN — PROPOFOL 100 MG: 10 INJECTION, EMULSION INTRAVENOUS at 10:43

## 2024-07-02 RX ADMIN — LIDOCAINE HYDROCHLORIDE 50 MG: 10 INJECTION, SOLUTION EPIDURAL; INFILTRATION; INTRACAUDAL; PERINEURAL at 10:44

## 2024-07-02 RX ADMIN — PROPOFOL 160 MCG/KG/MIN: 10 INJECTION, EMULSION INTRAVENOUS at 10:44

## 2024-07-02 RX ADMIN — SODIUM CHLORIDE, POTASSIUM CHLORIDE, SODIUM LACTATE AND CALCIUM CHLORIDE: 600; 310; 30; 20 INJECTION, SOLUTION INTRAVENOUS at 10:19

## 2024-07-02 RX ADMIN — ONDANSETRON 4 MG: 2 INJECTION INTRAMUSCULAR; INTRAVENOUS at 10:44

## 2024-07-02 ASSESSMENT — PAIN - FUNCTIONAL ASSESSMENT
PAIN_FUNCTIONAL_ASSESSMENT: 0-10
PAIN_FUNCTIONAL_ASSESSMENT: 0-10

## 2024-07-02 ASSESSMENT — PAIN SCALES - GENERAL
PAINLEVEL_OUTOF10: 0
PAINLEVEL_OUTOF10: 0

## 2024-07-02 ASSESSMENT — PAIN DESCRIPTION - DESCRIPTORS: DESCRIPTORS: SHARP;STABBING

## 2024-07-02 NOTE — ANESTHESIA PRE PROCEDURE
\"HEPCAB\"    COVID-19 Screening (If Applicable): No results found for: \"COVID19\"        Anesthesia Evaluation  Patient summary reviewed  Airway: Mallampati: II  TM distance: >3 FB     Mouth opening: > = 3 FB   Dental:          Pulmonary:   (+)           asthma:                            Cardiovascular:          ECG reviewed                        Neuro/Psych:   (+) neuromuscular disease:            GI/Hepatic/Renal:   (+) GERD:          Endo/Other:                     Abdominal:             Vascular:          Other Findings:         Anesthesia Plan      general and TIVA     ASA 3       Induction: intravenous.      Anesthetic plan and risks discussed with patient.      Plan discussed with surgical team.                Geraldo Alcantar, MAKEDA - CRNA   7/2/2024

## 2024-07-02 NOTE — OP NOTE
EGD PROCEDURE NOTE:      Pre op diagnosis:     Nausea [R11.0]  Esophageal dysphagia [R13.19]   Last EGD 2022 at  showed gastritis  No current treatment      Operative Procedure:    1.  EGD with cold biopsy    Surgeon:    Dr. Stephen Mcclure     Anesthesia:    IV sedation per CRNA    EBL:  minimal      Procedure:    Patient was taken to the endoscopy suite and placed in a left lateral decubitus position.  They were given IV sedation as mentioned above.  The gastric scope was passed through the mouth piece and down the esophagus, stomach and into the second portion of the duodenum.  It was withdrawn slowly and cold biopsies were taken in the antrum,body of the stomach and the distal esophagus.  The scope was retroflexed in the stomach and GE junction was examined.  The following findings were noted.      Final Diagnosis:    GE junction at 35 cm  Mild diffuse gastritis  Mild distal esophagitis  Duodenum normal    Plan:    Would recommend Prilosec 20 mg q am and pepcid 20 mg q pm  Await bx  May consider esophagram in not better to evaluate motility

## 2024-07-02 NOTE — PROGRESS NOTES
rounding in OR.    Assessment: Patient was recovering from her procedure and accompanied by her friend (Ra).  Patient had a positive outlook and asked for prayer for her loved ones and to feel better.     07/02/24 1129   Encounter Summary   Encounter Overview/Reason Spiritual/Emotional Needs;Post-Procedural   Service Provided For Patient;Friend   Referral/Consult From Rounding   Support System Friends/neighbors   Last Encounter  07/02/24   Complexity of Encounter Low   Begin Time 1110   End Time  1125   Total Time Calculated 15 min   Spiritual/Emotional needs   Type Spiritual Support   Assessment/Intervention/Outcome   Assessment Calm;Coping;Hopeful   Intervention Active listening;Prayer (assurance of)/[place];Sustaining Presence/Ministry of presence   Outcome Engaged in conversation;Encouraged;Expressed Gratitude;Receptive;Optimistic   Plan and Referrals   Does the patient have a [facility] PCP? Yes    to follow up after discharge? No         Intervention: Engaged in conversation and active listening. Prayed with Patient and her friend.     Outcome: Patient expressed appreciation for visit and offer of continued prayer.    Plan: Chaplains are available on site or on call 24/7 for spiritual and emotional support.    Electronically signed by Julisa Ponce on 7/2/2024 at 11:31 AM

## 2024-07-02 NOTE — ANESTHESIA POSTPROCEDURE EVALUATION
Department of Anesthesiology  Postprocedure Note    Patient: Angelika Marie  MRN: [id number]  YOB: 1950  Date of evaluation: 7/2/2024    Procedure Summary       Date: 07/02/24 [facility]    Anesthesia Start: 1041 Anesthesia Stop: 1055    Procedure: ESOPHAGOGASTRODUODENOSCOPY BIOPSY Diagnosis:       Nausea      Esophageal dysphagia      (Nausea [R11.0])      (Esophageal dysphagia [R13.19])    Surgeons: Stephen Mcclure MD Responsible Provider: Geraldo Alcantar APRN - CRNA    Anesthesia Type: General, TIVA ASA Status: 3            Anesthesia Type: General, TIVA    Noé Phase I: Noé Score: 10    Noé Phase II: Noé Score: 9    Anesthesia Post Evaluation    Patient location during evaluation: bedside  Level of consciousness: sleepy but conscious  Airway patency: patent  Nausea & Vomiting: no nausea and no vomiting  Cardiovascular status: hemodynamically stable  Respiratory status: spontaneous ventilation  Hydration status: stable  Pain management: satisfactory to patient    No notable events documented.

## 2024-07-02 NOTE — DISCHARGE INSTRUCTIONS
Would recommend Prilosec 20 mg q am and pepcid 20 mg q pm  Await bx  3.  May consider esophagram in not better to evaluate motility        DISCHARGE INSTRUCTIONS FOLLOWING EGD    Activity  You have received sedation.  Your judgement and coordination may be impaired.  Do not drive or operate any machinery today.  Do not make personal, medical, legal or business decisions today.  Do not consume alcohol, tranquilizers or sleeping medications today unless otherwise instructed by your physician.  Rest today.  You may resume normal activity tomorrow.    Because air is put into your stomach during this procedure, it is normal to pass large amounts of air/belch.  Feelings of  bloating, gas or cramping may persist for 24 hours.    Diet  Begin with sips of clear liquids and if no nausea, you may progress to your normal diet.    Medications  You may resume your usual medications, unless otherwise instructed.    Follow-Up  As instructed.    CALL YOUR PHYSICIAN if you experience any of the following:  Chest pain not relieved by belching.  Abdominal pain/cramping and/or distention that is not relieved by passing gas.  Persistent nausea & vomiting.  Signs of infection including fever, chills, redness or swelling @ the IV site.    If you have questions/problems, call [phone] ([facility]) or after regular business hours call [phone] ([facility])DISCHARGE INSTRUCTIONS FOLLOWING EGD    Activity  You have received sedation.  Your judgement and coordination may be impaired.  Do not drive or operate any machinery today.  Do not make personal, medical, legal or business decisions today.  Do not consume alcohol, tranquilizers or sleeping medications today unless otherwise instructed by your physician.  Rest today.  You may resume normal activity tomorrow.    Because air is put into your stomach during this procedure, it is normal to pass large amounts of air/belch.  Feelings of  bloating, gas or cramping may

## 2024-07-02 NOTE — H&P
SURGERY      COLONOSCOPY      COLONOSCOPY  05/22/2014    diverticuloslg. ext. hemoorrhoid    COLONOSCOPY  03/14/2023    diverticulosis of sigmoid colon; Dr. Nath at [facility]    DILATION AND CURETTAGE OF UTERUS      EYE SURGERY Bilateral 2013    FOOT SURGERY  01/30/2017    LAPAROSCOPY  1971    exploratory - was found to have adhesions.  told large and small intestines, appendix and ovary all matted together.    NERVE BLOCK  03/20/2015    decadron 10. duramorph 1.5 mg    OVARY REMOVAL Right 2002    TONSILLECTOMY      TONSILLECTOMY      UPPER GASTROINTESTINAL ENDOSCOPY       Past Medical History:   Diagnosis Date    Asthma     Fibromyalgia     GERD (gastroesophageal reflux disease)     Hemorrhoid     Hyperlipidemia     IBS (irritable bowel syndrome)     Multiple food allergies     Osteoarthritis     Vaginal atrophy      No current facility-administered medications on file prior to encounter.     Current Outpatient Medications on File Prior to Encounter   Medication Sig Dispense Refill    famotidine (PEPCID) 20 MG tablet Take 1 tablet by mouth daily      albuterol sulfate HFA (PROVENTIL;VENTOLIN;PROAIR) 108 (90 Base) MCG/ACT inhaler       docusate (COLACE, DULCOLAX) 100 MG CAPS Take 100 mg by mouth daily      estradiol (ESTRACE) 0.1 MG/GM vaginal cream  (Patient not taking: Reported on 5/20/2024)      senna (SENOKOT) 8.6 MG tablet A quarter of a capsule      LINZESS 145 MCG capsule TAKE ONE CAPSULE BY MOUTH EVERY MORNING BEFORE BREAKFAST (Patient taking differently: Take 2 capsules by mouth) 30 capsule 3    polyethylene glycol (GLYCOLAX) powder Take 17 g by mouth daily TAKES 4 CUPFULLS DAILY      Probiotic Product (PROBIOTIC DAILY PO) Take  by mouth.       Allergies as of 05/20/2024 - Fully Reviewed 05/20/2024   Allergen Reaction Noted    Prochlorperazine edisylate Other (See Comments) 05/01/2007    Casein Other (See Comments) 07/20/2021    Lactulose  02/28/2023    Statins Other (See Comments) 06/27/2019

## 2024-07-03 LAB — SURGICAL PATHOLOGY REPORT: NORMAL

## 2024-07-09 ENCOUNTER — HOSPITAL ENCOUNTER (EMERGENCY)
Age: 74
Discharge: HOME | End: 2024-07-09
Attending: EMERGENCY MEDICINE
Payer: MEDICARE

## 2024-07-09 ENCOUNTER — APPOINTMENT (OUTPATIENT)
Dept: CT IMAGING | Age: 74
End: 2024-07-09
Payer: MEDICARE

## 2024-07-09 ENCOUNTER — TELEPHONE (OUTPATIENT)
Dept: SURGERY | Age: 74
End: 2024-07-09

## 2024-07-09 VITALS
TEMPERATURE: 97.4 F | DIASTOLIC BLOOD PRESSURE: 82 MMHG | SYSTOLIC BLOOD PRESSURE: 136 MMHG | OXYGEN SATURATION: 97 % | BODY MASS INDEX: 22.14 KG/M2 | RESPIRATION RATE: 16 BRPM | HEART RATE: 79 BPM | WEIGHT: 125 LBS

## 2024-07-09 DIAGNOSIS — R10.31 RIGHT LOWER QUADRANT ABDOMINAL PAIN: Primary | ICD-10-CM

## 2024-07-09 LAB
ALBUMIN SERPL-MCNC: 4.1 G/DL (ref 3.5–5.2)
ALBUMIN/GLOB SERPL: 1.8 {RATIO} (ref 1–2.5)
ALP SERPL-CCNC: 96 U/L (ref 35–104)
ALT SERPL-CCNC: 22 U/L (ref 5–33)
ANION GAP SERPL CALCULATED.3IONS-SCNC: 12 MMOL/L (ref 9–17)
AST SERPL-CCNC: 21 U/L
BASOPHILS # BLD: <0.03 K/UL (ref 0–0.2)
BASOPHILS NFR BLD: 1 % (ref 0–2)
BILIRUB SERPL-MCNC: 0.8 MG/DL (ref 0.3–1.2)
BILIRUB UR QL STRIP: NEGATIVE
BUN SERPL-MCNC: 8 MG/DL (ref 8–23)
BUN/CREAT SERPL: 11 (ref 9–20)
CALCIUM SERPL-MCNC: 9.5 MG/DL (ref 8.6–10.4)
CHLORIDE SERPL-SCNC: 103 MMOL/L (ref 98–107)
CLARITY UR: CLEAR
CO2 SERPL-SCNC: 25 MMOL/L (ref 20–31)
COLOR UR: YELLOW
COMMENT: ABNORMAL
CREAT SERPL-MCNC: 0.7 MG/DL (ref 0.5–0.9)
EOSINOPHIL # BLD: 0.08 K/UL (ref 0–0.44)
EOSINOPHILS RELATIVE PERCENT: 2 % (ref 1–4)
ERYTHROCYTE [DISTWIDTH] IN BLOOD BY AUTOMATED COUNT: 11.9 % (ref 11.8–14.4)
GFR, ESTIMATED: 90 ML/MIN/1.73M2
GLUCOSE SERPL-MCNC: 107 MG/DL (ref 70–99)
GLUCOSE UR STRIP-MCNC: NEGATIVE MG/DL
HCT VFR BLD AUTO: 43.7 % (ref 36.3–47.1)
HGB BLD-MCNC: 15.6 G/DL (ref 11.9–15.1)
HGB UR QL STRIP.AUTO: NEGATIVE
IMM GRANULOCYTES # BLD AUTO: <0.03 K/UL (ref 0–0.3)
IMM GRANULOCYTES NFR BLD: 0 %
KETONES UR STRIP-MCNC: NEGATIVE MG/DL
LEUKOCYTE ESTERASE UR QL STRIP: NEGATIVE
LIPASE SERPL-CCNC: 18 U/L (ref 13–60)
LYMPHOCYTES NFR BLD: 1.07 K/UL (ref 1.1–3.7)
LYMPHOCYTES RELATIVE PERCENT: 29 % (ref 24–43)
MCH RBC QN AUTO: 31.5 PG (ref 25.2–33.5)
MCHC RBC AUTO-ENTMCNC: 35.7 G/DL (ref 25.2–33.5)
MCV RBC AUTO: 88.1 FL (ref 82.6–102.9)
MONOCYTES NFR BLD: 0.31 K/UL (ref 0.1–1.2)
MONOCYTES NFR BLD: 8 % (ref 3–12)
NEUTROPHILS NFR BLD: 60 % (ref 36–65)
NEUTS SEG NFR BLD: 2.23 K/UL (ref 1.5–8.1)
NITRITE UR QL STRIP: NEGATIVE
NRBC BLD-RTO: 0 PER 100 WBC
PH UR STRIP: 8 [PH] (ref 5–6)
PLATELET # BLD AUTO: 202 K/UL (ref 138–453)
PMV BLD AUTO: 9.5 FL (ref 8.1–13.5)
POTASSIUM SERPL-SCNC: 3.7 MMOL/L (ref 3.7–5.3)
PROT SERPL-MCNC: 6.4 G/DL (ref 6.4–8.3)
PROT UR STRIP-MCNC: NEGATIVE MG/DL
RBC # BLD AUTO: 4.96 M/UL (ref 3.95–5.11)
SODIUM SERPL-SCNC: 140 MMOL/L (ref 135–144)
SP GR UR STRIP: 1.01 (ref 1.01–1.02)
UROBILINOGEN UR STRIP-ACNC: NORMAL EU/DL (ref 0–1)
WBC OTHER # BLD: 3.7 K/UL (ref 3.5–11.3)

## 2024-07-09 PROCEDURE — 83690 ASSAY OF LIPASE: CPT

## 2024-07-09 PROCEDURE — 99284 EMERGENCY DEPT VISIT MOD MDM: CPT

## 2024-07-09 PROCEDURE — 85025 COMPLETE CBC W/AUTO DIFF WBC: CPT

## 2024-07-09 PROCEDURE — 81003 URINALYSIS AUTO W/O SCOPE: CPT

## 2024-07-09 PROCEDURE — 96372 THER/PROPH/DIAG INJ SC/IM: CPT

## 2024-07-09 PROCEDURE — 6360000002 HC RX W HCPCS: Performed by: EMERGENCY MEDICINE

## 2024-07-09 PROCEDURE — 80053 COMPREHEN METABOLIC PANEL: CPT

## 2024-07-09 PROCEDURE — 74176 CT ABD & PELVIS W/O CONTRAST: CPT

## 2024-07-09 RX ORDER — DICYCLOMINE HCL 20 MG
20 TABLET ORAL 3 TIMES DAILY PRN
Qty: 20 TABLET | Refills: 0 | Status: SHIPPED | OUTPATIENT
Start: 2024-07-09

## 2024-07-09 RX ORDER — DICYCLOMINE HYDROCHLORIDE 10 MG/ML
20 INJECTION INTRAMUSCULAR ONCE
Status: COMPLETED | OUTPATIENT
Start: 2024-07-09 | End: 2024-07-09

## 2024-07-09 RX ADMIN — DICYCLOMINE HYDROCHLORIDE 20 MG: 10 INJECTION, SOLUTION INTRAMUSCULAR at 17:27

## 2024-07-09 ASSESSMENT — PAIN DESCRIPTION - ORIENTATION: ORIENTATION: RIGHT;LOWER

## 2024-07-09 ASSESSMENT — PAIN DESCRIPTION - LOCATION: LOCATION: ABDOMEN;FLANK

## 2024-07-09 ASSESSMENT — PAIN - FUNCTIONAL ASSESSMENT: PAIN_FUNCTIONAL_ASSESSMENT: 0-10

## 2024-07-09 ASSESSMENT — PAIN SCALES - GENERAL: PAINLEVEL_OUTOF10: 9

## 2024-07-09 NOTE — TELEPHONE ENCOUNTER
Patient called stating she is in excruciating pain in the right lower side for the last week. She was trying to wait till her appointment on 7/15/2024, but can not wait that long. Stated the pain is a 10 out of 10 and did not want to go to ER because they just send her home and tell her to call her doctor. Patient asking for advice.Please advise. Can contact patient at # [phone].

## 2024-07-09 NOTE — TELEPHONE ENCOUNTER
Called patient back. She states that she is having RLQ pain that is very severe 10/10.  She has been having this since her EGD on 7-2-24 but getting worse everyday.   Patient states that she is having constipation. She has tried enemas, Miralax four times a day, stool softener, and Linzess.  She is only having small sludge for a bowel movement. No form to it.  Patient does have appointment on 7-17-24 and can not wait til then. She believes that she could have a blockage.  Explained to patient that Dr Mcclure is in the OR and will try to talk with him. Advised patient to go the ER in [place] due to Dr Mcclure is on call. Patient states that she does not want to really go to the ER because they never do anything for her.

## 2024-07-09 NOTE — ED NOTES
Patient has hx of ibs with constipation, patient follows a strict diet and fluids and takes medication regimen. States she had a ct ~ 1 month ago, since then had poison ivy and was on steroids, also had upper gi scope. States she doesn't take narcotics for any of these things because of constipation issues.   Took a home enema and no success. She is a patient of Dr Marcos. States she was last hospitalized for this about 1 year ago.

## 2024-07-09 NOTE — TELEPHONE ENCOUNTER
Spoke with Dr Mcclure and recommends the following  Yes if 10/10 needs to be seen by physician , I can not get to office she needs UC or ER to get testing done and call me      Called patient with this information and she states that she is on her way to the ER now.

## 2024-07-09 NOTE — ED PROVIDER NOTES
[facility] ED  EMERGENCY DEPARTMENT ENCOUNTER      Pt Name: Angelika Marie  MRN: [id number]  Birthdate 1950  Date of evaluation: 7/9/2024  Provider: Roger Long MD    CHIEF COMPLAINT     Chief Complaint   Patient presents with    Abdominal Pain         HISTORY OF PRESENT ILLNESS   (Location/Symptom, Timing/Onset, Context/Setting,Quality, Duration, Modifying Factors, Severity)  Note limiting factors.   Angelika Marie is a 74 y.o. female who presents to the emergency department with a chief complaint of right lower quadrant pain of 10 days duration.  She states it has been more intense in the last 5 days.  She denies chills or fever, nausea and vomiting.  She recently underwent upper GI endoscopy.  Patient has a history of irritable bowel syndrome with predominant constipation and takes MiraLAX and Linzess.    HPI    Nursing Notes werereviewed.    REVIEW OF SYSTEMS    (2-9 systems for level 4, 10 or more for level 5)     Review of Systems   All other systems reviewed and are negative.      Except as noted above the remainder of the review of systems was reviewed and negative.       PAST MEDICAL HISTORY     Past Medical History:   Diagnosis Date    Asthma     Fibromyalgia     GERD (gastroesophageal reflux disease)     Hemorrhoid     Hyperlipidemia     IBS (irritable bowel syndrome)     Multiple food allergies     Osteoarthritis     Vaginal atrophy          SURGICALHISTORY       Past Surgical History:   Procedure Laterality Date    APPENDECTOMY  1968    BACK SURGERY      Dr. Link in 2015x 2; L4-S1 laminetomy in Jan and revision in March 2015    CAPSULOTOMY Left 01/28/2022    CAPSULOTOMY Right 01/21/2022    CARPAL TUNNEL RELEASE Right     CATARACT EXTRACTION Bilateral 2020    CHOLECYSTECTOMY      2000s    COLON SURGERY      COLONOSCOPY      COLONOSCOPY  05/22/2014    diverticuloslg. ext. hemoorrhoid    COLONOSCOPY  03/14/2023    diverticulosis of sigmoid colon; Dr. Nath at [facility]    DILATION AND

## 2024-07-11 ENCOUNTER — OFFICE VISIT (OUTPATIENT)
Dept: SURGERY | Age: 74
End: 2024-07-11
Payer: MEDICARE

## 2024-07-11 VITALS
DIASTOLIC BLOOD PRESSURE: 76 MMHG | RESPIRATION RATE: 16 BRPM | WEIGHT: 130.2 LBS | HEIGHT: 63 IN | SYSTOLIC BLOOD PRESSURE: 124 MMHG | TEMPERATURE: 97.4 F | HEART RATE: 76 BPM | BODY MASS INDEX: 23.07 KG/M2

## 2024-07-11 DIAGNOSIS — K58.9 IRRITABLE BOWEL SYNDROME WITHOUT DIARRHEA: Primary | ICD-10-CM

## 2024-07-11 PROCEDURE — 99215 OFFICE O/P EST HI 40 MIN: CPT | Performed by: SURGERY

## 2024-07-11 RX ORDER — BISACODYL 5 MG/1
TABLET, DELAYED RELEASE ORAL
Qty: 4 TABLET | Refills: 0 | Status: SHIPPED | OUTPATIENT
Start: 2024-07-11

## 2024-07-11 RX ORDER — POLYETHYLENE GLYCOL 3350 17 G/17G
POWDER, FOR SOLUTION ORAL
Qty: 238 G | Refills: 0 | Status: SHIPPED | OUTPATIENT
Start: 2024-07-11

## 2024-07-11 NOTE — PROGRESS NOTES
Pepcid 20 mg nightly. She reports she has had the dysphagia for years and thinks the nausea acts up when she is \"backed up\".     Her bowel regimen daily is: quarter of a capsule of senna in AM, Linzess 290mcg in AM, Colace 100mg in AM and 4 capfuls of miralax in the PM

## 2024-07-12 ENCOUNTER — TELEPHONE (OUTPATIENT)
Dept: SURGERY | Age: 74
End: 2024-07-12

## 2024-07-15 PROBLEM — F41.9 ANXIETY: Status: ACTIVE | Noted: 2024-07-15

## 2024-07-15 RX ORDER — DICYCLOMINE HCL 20 MG
20 TABLET ORAL 3 TIMES DAILY PRN
Qty: 20 TABLET | Refills: 3 | Status: SHIPPED | OUTPATIENT
Start: 2024-07-15

## 2024-07-17 NOTE — TELEPHONE ENCOUNTER
Received call from patient regarding the script for [employer] nutrition drinks. I explained we completed the order but need to know where to fax it too. Per sheet [employer]. Patient requesting [facility] in [place]. Their company responded back they do not carry this product and cannot provide. Notified patient of this. I also explained on [employer] website, shows medicare does not cover these unless they are tube feedings. I explained I signed her up for samples to be shipped to her house and for their company to reach out to discuss coverage. She mentioned medical necessity form - I explained that would have to come from her GI as Dr. Mcclure did not request she take these-she asked if he would write the script as this is something she wanted to do. She verbalized understanding but states she does not see a GI at this time as she has been following Dr. Mcclure for all her GI issues. We will wait to see what happens once [employer] reaches out to the patient.    She mentions the bentyl helps but she is having this abd pain daily. Wanting to know if she can take tylenol, ibuprofen or aleve. I explained she can take those medications to help with the pain as well.

## 2024-07-29 NOTE — PROGRESS NOTES
Angelika Marie is a 74 y.o. female      CC:    Right lower abd pain with constipation  Think she might have parital bowel blockage    HISTORY OF PRESENT ILLNESS:    Pt is a 75 yo F who had EGD and GERD.  Doing better on meds . If not better may do motility studies.  But today is concerned about increasing RLQ pain and constipation and change in stools .  Has a partial blockage she feels.  She thinks her GERD acts up with she is \"backed up\"    Patient here for f/u after EGD and to discuss GI issues.     EGD with Dr. Mcclure on 7/2/2024 for nausea, dysphagia:      Mild diffuse gastritis  Mild distal esophagitis     Plan:  Would recommend Prilosec 20 mg q am and pepcid 20 mg q pm  Await bx  May consider esophagram in not better to evaluate motility     Patient then called on 7/9/2024- due to right excruciating pain in right lower side for the last week with constipation. She had tried enemas, miralax, stool softeners and linzess. Only had small sludge for a bowel movement no form to it. She thought she had a blockage. Dr. Mcclure recommended ER. Patient went to the ER which they prescribed patient bentyl.     CT abd and pelvis done on  7/9/2024 while in ER which showed:  IMPRESSION:  1. No acute findings in the abdomen or pelvis on this unenhanced exam.  2. Punctate nonobstructing right renal calculus.  3. Colonic diverticulosis without acute diverticulitis.        She reports abd pain has improved with the bentyl injection at the ER. She took 1 tablet of bentyl yesterday and 1 this morning- states pain is coming back but did not take another pill as she was coming to see Dr. Mcclure today and wanted him to examine here with the pain. She reports 20 yrs ago had right ovary removed due to it being adhered to her intestine and ever since will have pain in the right side when she gets backed up or colon gets inflamed. Denies any rectal bleeding. Bowels will change color depending what she eats. Bowels usually brown/orangish

## 2024-07-30 ENCOUNTER — TELEPHONE (OUTPATIENT)
Dept: SURGERY | Age: 74
End: 2024-07-30

## 2024-07-30 NOTE — TELEPHONE ENCOUNTER
Left message for patient to call office regarding EGD results and scheduling appointment in 8-12 weeks for follow up.     Updated history and forwarded results to PCP.

## 2024-08-13 NOTE — H&P
Angelika Marie is a 74 y.o. female        CC:     Right lower abd pain with constipation  Think she might have parital bowel blockage     HISTORY OF PRESENT ILLNESS:     Pt is a 73 yo F who had EGD and GERD.  Doing better on meds . If not better may do motility studies.  But today is concerned about increasing RLQ pain and constipation and change in stools .  Has a partial blockage she feels.  She thinks her GERD acts up with she is \"backed up\"     Patient here for f/u after EGD and to discuss GI issues.     EGD with Dr. Mcclure on 7/2/2024 for nausea, dysphagia:      Mild diffuse gastritis  Mild distal esophagitis     Plan:  Would recommend Prilosec 20 mg q am and pepcid 20 mg q pm  Await bx  May consider esophagram in not better to evaluate motility     Patient then called on 7/9/2024- due to right excruciating pain in right lower side for the last week with constipation. She had tried enemas, miralax, stool softeners and linzess. Only had small sludge for a bowel movement no form to it. She thought she had a blockage. Dr. Mcclure recommended ER. Patient went to the ER which they prescribed patient bentyl.     CT abd and pelvis done on  7/9/2024 while in ER which showed:  IMPRESSION:  1. No acute findings in the abdomen or pelvis on this unenhanced exam.  2. Punctate nonobstructing right renal calculus.  3. Colonic diverticulosis without acute diverticulitis.        She reports abd pain has improved with the bentyl injection at the ER. She took 1 tablet of bentyl yesterday and 1 this morning- states pain is coming back but did not take another pill as she was coming to see Dr. Mcclure today and wanted him to examine here with the pain. She reports 20 yrs ago had right ovary removed due to it being adhered to her intestine and ever since will have pain in the right side when she gets backed up or colon gets inflamed. Denies any rectal bleeding. Bowels will change color depending what she eats. Bowels usually brown/orangish

## 2024-08-20 ENCOUNTER — ANESTHESIA EVENT (OUTPATIENT)
Dept: OPERATING ROOM | Age: 74
End: 2024-08-20
Payer: MEDICARE

## 2024-08-20 ENCOUNTER — ANESTHESIA (OUTPATIENT)
Dept: OPERATING ROOM | Age: 74
End: 2024-08-20
Payer: MEDICARE

## 2024-08-20 ENCOUNTER — HOSPITAL ENCOUNTER (OUTPATIENT)
Age: 74
Setting detail: OUTPATIENT SURGERY
Discharge: HOME | End: 2024-08-20
Attending: SURGERY | Admitting: SURGERY
Payer: MEDICARE

## 2024-08-20 VITALS
SYSTOLIC BLOOD PRESSURE: 138 MMHG | HEIGHT: 63 IN | HEART RATE: 61 BPM | RESPIRATION RATE: 16 BRPM | BODY MASS INDEX: 23.35 KG/M2 | WEIGHT: 131.8 LBS | TEMPERATURE: 98 F | OXYGEN SATURATION: 98 % | DIASTOLIC BLOOD PRESSURE: 69 MMHG

## 2024-08-20 DIAGNOSIS — R10.9 RIGHT SIDED ABDOMINAL PAIN: ICD-10-CM

## 2024-08-20 DIAGNOSIS — Z87.19 HISTORY OF IBS: ICD-10-CM

## 2024-08-20 PROCEDURE — 6360000002 HC RX W HCPCS

## 2024-08-20 PROCEDURE — 2500000003 HC RX 250 WO HCPCS

## 2024-08-20 PROCEDURE — 2580000003 HC RX 258: Performed by: SURGERY

## 2024-08-20 PROCEDURE — 7100000011 HC PHASE II RECOVERY - ADDTL 15 MIN: Performed by: SURGERY

## 2024-08-20 PROCEDURE — 3700000001 HC ADD 15 MINUTES (ANESTHESIA): Performed by: SURGERY

## 2024-08-20 PROCEDURE — 2709999900 HC NON-CHARGEABLE SUPPLY: Performed by: SURGERY

## 2024-08-20 PROCEDURE — 3700000000 HC ANESTHESIA ATTENDED CARE: Performed by: SURGERY

## 2024-08-20 PROCEDURE — 3609010400 HC COLONOSCOPY POLYPECTOMY HOT BIOPSY: Performed by: SURGERY

## 2024-08-20 PROCEDURE — 7100000010 HC PHASE II RECOVERY - FIRST 15 MIN: Performed by: SURGERY

## 2024-08-20 PROCEDURE — 88305 TISSUE EXAM BY PATHOLOGIST: CPT

## 2024-08-20 RX ORDER — SODIUM CHLORIDE, SODIUM LACTATE, POTASSIUM CHLORIDE, CALCIUM CHLORIDE 600; 310; 30; 20 MG/100ML; MG/100ML; MG/100ML; MG/100ML
INJECTION, SOLUTION INTRAVENOUS CONTINUOUS
Status: DISCONTINUED | OUTPATIENT
Start: 2024-08-20 | End: 2024-08-20 | Stop reason: HOSPADM

## 2024-08-20 RX ORDER — LIDOCAINE HYDROCHLORIDE 40 MG/ML
INJECTION, SOLUTION RETROBULBAR PRN
Status: DISCONTINUED | OUTPATIENT
Start: 2024-08-20 | End: 2024-08-20 | Stop reason: SDUPTHER

## 2024-08-20 RX ORDER — PROPOFOL 10 MG/ML
INJECTION, EMULSION INTRAVENOUS PRN
Status: DISCONTINUED | OUTPATIENT
Start: 2024-08-20 | End: 2024-08-20 | Stop reason: SDUPTHER

## 2024-08-20 RX ADMIN — PROPOFOL 100 MG: 10 INJECTION, EMULSION INTRAVENOUS at 09:53

## 2024-08-20 RX ADMIN — PROPOFOL 150 MCG/KG/MIN: 10 INJECTION, EMULSION INTRAVENOUS at 09:54

## 2024-08-20 RX ADMIN — SODIUM CHLORIDE, POTASSIUM CHLORIDE, SODIUM LACTATE AND CALCIUM CHLORIDE: 600; 310; 30; 20 INJECTION, SOLUTION INTRAVENOUS at 09:23

## 2024-08-20 RX ADMIN — PROPOFOL 20 MG: 10 INJECTION, EMULSION INTRAVENOUS at 09:55

## 2024-08-20 RX ADMIN — PROPOFOL 20 MG: 10 INJECTION, EMULSION INTRAVENOUS at 09:58

## 2024-08-20 RX ADMIN — LIDOCAINE HYDROCHLORIDE 50 MG: 40 INJECTION, SOLUTION RETROBULBAR; TOPICAL at 09:53

## 2024-08-20 ASSESSMENT — PAIN DESCRIPTION - DESCRIPTORS: DESCRIPTORS: ACHING

## 2024-08-20 ASSESSMENT — PAIN - FUNCTIONAL ASSESSMENT: PAIN_FUNCTIONAL_ASSESSMENT: 0-10

## 2024-08-20 NOTE — ANESTHESIA PRE PROCEDURE
glycol (MIRALAX) 17 GM/SCOOP powder Take as directed the day of the bowel prep 238 g 0    omeprazole (PRILOSEC OTC) 20 MG tablet Take 1 tablet by mouth every morning 30 tablet 2    famotidine (PEPCID) 20 MG tablet Take 1 tablet by mouth every morning 30 tablet 2    docusate (COLACE, DULCOLAX) 100 MG CAPS Take 100 mg by mouth daily      estradiol (ESTRACE) 0.1 MG/GM vaginal cream  (Patient not taking: Reported on 5/20/2024)      senna (SENOKOT) 8.6 MG tablet A quarter of a capsule      LINZESS 145 MCG capsule TAKE ONE CAPSULE BY MOUTH EVERY MORNING BEFORE BREAKFAST (Patient taking differently: Take 2 capsules by mouth) 30 capsule 3    polyethylene glycol (GLYCOLAX) powder Take 17 g by mouth daily TAKES 4 CUPFULLS DAILY      Probiotic Product (PROBIOTIC DAILY PO) Take  by mouth.         Allergies:    Allergies   Allergen Reactions    Prochlorperazine Edisylate Other (See Comments)     \"spaced out\"    Casein Other (See Comments)     Dairy allergy, constipation      Contrast [Gadolinium Derivatives] Swelling     Ct scan IV or PO  Patient states it was actually poison ivy and not a reaction to the contrast    Lactulose      constipation    Statins Other (See Comments)     constipation      Wheat     Amoxicillin-Pot Clavulanate Diarrhea              Problem List:    Patient Active Problem List   Diagnosis Code    Back pain M54.9    Fibromyalgia M79.7    Hyperlipidemia E78.5    Osteoarthritis M19.90    Asthma J45.909    IBS (irritable bowel syndrome) K58.9    Hemorrhoid K64.9    Agitation R45.1    Jittery R45.0    Left leg numbness R20.0    Left leg pain M79.605    Opiate withdrawal (HCC) F11.93    History of back surgery Z98.890    Constipation K59.00    Hypokalemia E87.6    Nausea & vomiting R11.2    Displacement of lumbar intervertebral disc without myelopathy M51.26    Gastroesophageal reflux disease without esophagitis K21.9    Irritable bowel syndrome without diarrhea K58.9    Anxiety F41.9       Past Medical

## 2024-08-20 NOTE — ANESTHESIA POSTPROCEDURE EVALUATION
Department of Anesthesiology  Postprocedure Note    Patient: Angelika Marie  MRN: [id number]  YOB: 1950  Date of evaluation: 8/20/2024    Procedure Summary       Date: 08/20/24 Room / Location: [facility]    Anesthesia Start: 0942 Anesthesia Stop: 1018    Procedure: COLONOSCOPY POLYPECTOMY HOT BIOPSY (Anus) Diagnosis:       Right sided abdominal pain      History of IBS      (Right sided abdominal pain [R10.9])      (History of IBS [Z87.19])    Surgeons: Stephen Mcclure MD Responsible Provider: Josue Torrez APRN - CRNA    Anesthesia Type: General, TIVA ASA Status: 3            Anesthesia Type: General, TIVA    Noé Phase I: Noé Score: 10    Noé Phase II:      Anesthesia Post Evaluation    Patient location during evaluation: bedside  Patient participation: complete - patient participated  Level of consciousness: sleepy but conscious  Airway patency: patent  Nausea & Vomiting: no nausea  Cardiovascular status: hemodynamically stable  Respiratory status: room air  Hydration status: euvolemic  Pain management: satisfactory to patient    No notable events documented.

## 2024-08-20 NOTE — OP NOTE
COLONOSCOPY PROCEDURE NOTE:      Pre op diagnosis:     GERD a little better on meds  Increasing RLQ pain and constipation        Operative Procedure:    1.  Colonoscopy with with cold forceps and hot forceps    Surgeon:    Dr. Stephen Mcclure     Anesthesia:    IV sedation per CRNA    EBL:  minimal    Procedure:    Patient was taken to the endoscopy suite and placed in a left lateral decubitus position.  They were given IV sedation as mentioned above.  A digital rectal exam was performed.  There were no masses and anal tone was normal.  The colonoscope was inserted into the rectum and carefully manipulated up through the sigmoid colon, transverse colon, right colon and into the cecum.  The cecum was identified by the ileal cecal valve and transabdominal illumination.  Then the scope was slowly withdrawn.  The scope was retroflexed in the rectum and the dentate line was examined.  The scope was removed.  The patient tolerated the procedure well.  The following findings were noted.        Final Diagnosis:    Moderate diffuse diverticulosis  5 mm polyp at 90 cm, removed with cold forceps  8 mm polyp at 15 cm, removed with  hot forceps  Cold bx taken in the right colon to rule out microscopic colitis    Plan:    Await bx  2.  Then make further recommendations.  At this point did not see a definite source of pts RLQ pain.      Addendum:  Pt very concerned about her RLQ pain.  We have normal CT scan, exam, and CS except some mild diverticulosis.  So I have no source for her pain.  She said her daughter is a \"clinician\" and was worried about SBOG and wanted us to test for bacterial overgrowth. I suggested she return to [facility], but she said her GI doc is gone .  But we   May want to try their dept because we are not getting the answer.  I told her I would look into ordering test for bacterial overgrowth, wait for bx, and then set up GI referral, probably at [facility].

## 2024-08-20 NOTE — DISCHARGE INSTRUCTIONS
Await bx  2.  Then make further recommendations.  At this point did not see a definite source of pts RLQ pain.    DISCHARGE INSTRUCTIONS FOLLOWING COLONOSCOPY    Activity  You have received sedation.  Your judgement and coordination may be impaired.  Do not drive or operate any machinery today.  Do not make personal, medical, legal or business decisions today.  Do not consume alcohol, tranquilizers or sleeping medications today unless otherwise instructed by your physician.  Rest today.  You may resume normal activity tomorrow.    Because air is put into your colon during this procedure, it is normal to pass large amounts of air from your rectum.  Feelings of bloating, gas or cramping may persist for 24 hours.  You may not have a bowel movement for 1-3 days after this procedure.    Diet  Begin with sips of clear liquids and if no nausea, you may progress to your normal diet.    Medications  You may resume your usual medications, unless otherwise instructed.    Follow-Up  As instructed.    CALL YOUR PHYSICIAN if you experience any of the following:  Bleeding from your rectum (a teaspoonful or more)      - If you had a biopsy taken today, a small amount of bleeding may occur.  Severe abdominal pain/cramping and/or distention that is not relieved by passing gas.  Persistent nausea or vomiting.  Signs of infection including fever, chills, redness or swelling @ the IV site.      If you have questions or problems call [phone] ([facility]) or after business hours call [phone] ([facility])

## 2024-08-21 LAB — SURGICAL PATHOLOGY REPORT: NORMAL

## 2024-08-22 ENCOUNTER — TELEPHONE (OUTPATIENT)
Dept: SURGERY | Age: 74
End: 2024-08-22

## 2024-08-22 DIAGNOSIS — K59.00 CONSTIPATION, UNSPECIFIED CONSTIPATION TYPE: ICD-10-CM

## 2024-08-22 DIAGNOSIS — R19.7 DIARRHEA, UNSPECIFIED TYPE: ICD-10-CM

## 2024-08-22 DIAGNOSIS — G89.29 CHRONIC ABDOMINAL PAIN: Primary | ICD-10-CM

## 2024-08-22 DIAGNOSIS — R10.9 CHRONIC ABDOMINAL PAIN: Primary | ICD-10-CM

## 2024-08-22 NOTE — TELEPHONE ENCOUNTER
Patient spoke with Dr Mcclure on 8-20-24 and requesting a referral to [facility] for her chronic ABD pain.  Her family member is suggesting this place.    Referral sent     Phone: [phone]  Fax: [phone]    Patient needs to call office and register prior to making the appointment. Needs to call [phone]    Called patient and explained that referral was sent and she will need to register prior to their office calling her.  Patient had asked for the number to be sent to my chart.  Sent the note.

## 2024-08-23 NOTE — TELEPHONE ENCOUNTER
Patient had called and states that she had called [facility], where the referral was sent. Patient states that the next available appointment is not til March and that their office does not see Small intestinal bacterial overgrowth. DONAT[facility] told her that the department is the Liver and other disease Department. She would like Dr Mcclure to just order a Small bowel overgrowth test or Hydrogen Breath Test and send the order to Medical Procedure Unit at [facility]. Offered to patient to send a referral to [facility] and she states that she just needs to order for the test.    Spoke with Dr Mcclure regarding the above and he states that he does not order the Hydrogen Breath Test and that patient needs to go to a tertiary GI center. Will send the referral to [facility] on Monday.

## 2024-09-10 ENCOUNTER — TELEPHONE (OUTPATIENT)
Dept: SURGERY | Age: 74
End: 2024-09-10

## 2024-09-16 RX ORDER — DICYCLOMINE HCL 20 MG
20 TABLET ORAL 3 TIMES DAILY PRN
Qty: 20 TABLET | Refills: 3 | Status: SHIPPED | OUTPATIENT
Start: 2024-09-16 | End: 2024-09-16

## 2024-09-16 RX ORDER — DICYCLOMINE HCL 20 MG
20 TABLET ORAL 3 TIMES DAILY PRN
Qty: 90 TABLET | Refills: 2 | Status: SHIPPED | OUTPATIENT
Start: 2024-09-16 | End: 2024-10-16

## 2024-10-24 ENCOUNTER — TELEPHONE (OUTPATIENT)
Dept: SURGERY | Age: 74
End: 2024-10-24

## 2024-10-24 NOTE — TELEPHONE ENCOUNTER
Patient called back. Explained to her what Angel had said regarding the Linzess medication. Patient states that when she calls they are telling her a different story about the medication being shipped. Gave patient the number and Angel name to call and talk to her.  Patient states that she will come up to the office and get the Patient assistance Program papers.

## 2024-10-24 NOTE — TELEPHONE ENCOUNTER
Received a refill request form from my [employer] Assist program for Linzess via fax. Patient called and stated that it is the same paperwork that we did last year.  Writer looked back and the paperwork is different.    Called [phone] the assistant program and talked with Angel. She states that patient was just sent out a 3 month supply yesterday so she will have the medication til January. Patient will have to reapply for the patient access support enrollment forms and also apply for the Medicare extra help income forms. She states not to fill out the request refill form. Angel states that they sent the paperwork to the patient already.  Angel also faced the office the forms for the patient to .    Left message for to call office regarding this information.

## 2025-07-22 ENCOUNTER — OFFICE VISIT (OUTPATIENT)
Dept: PODIATRY | Age: 75
End: 2025-07-22
Payer: MEDICARE

## 2025-07-22 ENCOUNTER — HOSPITAL ENCOUNTER (OUTPATIENT)
Age: 75
Setting detail: SPECIMEN
Discharge: HOME | End: 2025-07-22
Payer: MEDICARE

## 2025-07-22 VITALS
BODY MASS INDEX: 23.4 KG/M2 | SYSTOLIC BLOOD PRESSURE: 127 MMHG | RESPIRATION RATE: 20 BRPM | DIASTOLIC BLOOD PRESSURE: 70 MMHG | HEART RATE: 80 BPM | WEIGHT: 130 LBS

## 2025-07-22 DIAGNOSIS — M79.2 NEUROPATHIC PAIN: ICD-10-CM

## 2025-07-22 DIAGNOSIS — L30.9 FOOT DERMATITIS: ICD-10-CM

## 2025-07-22 DIAGNOSIS — M21.372 FOOT DROP, LEFT FOOT: Primary | ICD-10-CM

## 2025-07-22 DIAGNOSIS — R26.89 FUNCTIONAL GAIT ABNORMALITY: ICD-10-CM

## 2025-07-22 PROCEDURE — 99204 OFFICE O/P NEW MOD 45 MIN: CPT | Performed by: PODIATRIST

## 2025-07-22 PROCEDURE — G8400 PT W/DXA NO RESULTS DOC: HCPCS | Performed by: PODIATRIST

## 2025-07-22 PROCEDURE — 1159F MED LIST DOCD IN RCRD: CPT | Performed by: PODIATRIST

## 2025-07-22 PROCEDURE — 1123F ACP DISCUSS/DSCN MKR DOCD: CPT | Performed by: PODIATRIST

## 2025-07-22 PROCEDURE — 99214 OFFICE O/P EST MOD 30 MIN: CPT | Performed by: PODIATRIST

## 2025-07-22 PROCEDURE — 1036F TOBACCO NON-USER: CPT | Performed by: PODIATRIST

## 2025-07-22 PROCEDURE — G8420 CALC BMI NORM PARAMETERS: HCPCS | Performed by: PODIATRIST

## 2025-07-22 PROCEDURE — 1090F PRES/ABSN URINE INCON ASSESS: CPT | Performed by: PODIATRIST

## 2025-07-22 PROCEDURE — 87220 TISSUE EXAM FOR FUNGI: CPT

## 2025-07-22 PROCEDURE — G8427 DOCREV CUR MEDS BY ELIG CLIN: HCPCS | Performed by: PODIATRIST

## 2025-07-22 NOTE — PROGRESS NOTES
Subjective:  Angelika Marie is a 75 y.o. female who presents to the office today complaining of multiple chronic conditions.  Symptoms began 10 year(s) ago.  States she had 2 back surgeries in the past been nerve damage.  Numbness used to be the whole leg and below the knee now just front of her foot.  Patient relates pain is Present.  Pain is rated 8 out of 10 and is described as waxing and waning, severe.  Treatments prior to today's visit include: multiple.  Patient states he is wore AFOs and tried multiple different styles over time.  Patient states more firm ones are not as comfortable show she has not worn them as much.  Still states she catches her foot and stumbles while walking.  Patient also complains of the nerve pain which has tried different medications over time.  Patient states cannot tolerate many medications in general.  Patient got no symptomatic benefit with those medications.  Patient also complains of fungal nails.  No treatment tried recently.  Was told previously she is not a good candidate for the oral medication.  Patient has a rash both sides mainly the left side.  Has been there for quite some time.  Patient has tried multiple prescription ointments and creams.  Patient denies any benefit.  Patient states itches off-and-on.  Comes and goes constantly.  Patient states she is seen multiple providers and multiple dermatologist.  Currently denies F/C/N/V.     Allergies   Allergen Reactions    Prochlorperazine Edisylate Other (See Comments)     \"spaced out\"    Casein Other (See Comments)     Dairy allergy, constipation      Lactulose      constipation    Statins Other (See Comments)     constipation      Wheat Other (See Comments)     Abdominal pain      Amoxicillin-Pot Clavulanate Diarrhea              Past Medical History:   Diagnosis Date    Anxiety     Asthma     Fibromyalgia     GERD (gastroesophageal reflux disease)     Hemorrhoid     IBS (irritable bowel syndrome)     Multiple food

## 2025-07-23 ENCOUNTER — TELEPHONE (OUTPATIENT)
Dept: PODIATRY | Age: 75
End: 2025-07-23

## 2025-07-23 LAB
MICROORGANISM/AGENT SPEC: NORMAL
SERVICE CMNT-IMP: NORMAL
SPECIMEN DESCRIPTION: NORMAL

## 2025-07-24 NOTE — TELEPHONE ENCOUNTER
Patient called back and given results. Patient wants to known what what she has if its not fungus. Please advise.

## (undated) DEVICE — FORCEPS BX L240CM JAW DIA2.2MM RAD JAW 4 HOT DISP

## (undated) DEVICE — FORCEPS BX L240CM JAW DIA2.4MM ORNG L CAP W/ NDL DISP RAD

## (undated) DEVICE — CO2 CANNULA,SSOFT,ADLT,7O2,4CO2,FEMALE: Brand: MEDLINE

## (undated) DEVICE — MERCY DEFIANCE ENDO KIT: Brand: MEDLINE INDUSTRIES, INC.

## (undated) DEVICE — PAD, GROUNDING, UNIVERSAL, SPLIT, 9': Brand: MEDLINE

## (undated) DEVICE — 4-PORT MANIFOLD: Brand: NEPTUNE 2

## (undated) DEVICE — BITE BLOCK W/VELCRO STRAP